# Patient Record
(demographics unavailable — no encounter records)

---

## 2024-11-18 NOTE — HEALTH RISK ASSESSMENT
[Little interest or pleasure doing things] : 1) Little interest or pleasure doing things [Feeling down, depressed, or hopeless] : 2) Feeling down, depressed, or hopeless [0] : 2) Feeling down, depressed, or hopeless: Not at all (0) [PHQ-9 Negative - No further assessment needed] : PHQ-9 Negative - No further assessment needed [[id number]] : 0 [Former] : Former

## 2024-11-18 NOTE — HEALTH RISK ASSESSMENT
[Little interest or pleasure doing things] : 1) Little interest or pleasure doing things [Feeling down, depressed, or hopeless] : 2) Feeling down, depressed, or hopeless [0] : 2) Feeling down, depressed, or hopeless: Not at all (0) [PHQ-9 Negative - No further assessment needed] : PHQ-9 Negative - No further assessment needed [[id number]] : 0 [Former] : Former

## 2024-11-18 NOTE — HEALTH RISK ASSESSMENT
[Little interest or pleasure doing things] : 1) Little interest or pleasure doing things [Feeling down, depressed, or hopeless] : 2) Feeling down, depressed, or hopeless [0] : 2) Feeling down, depressed, or hopeless: Not at all (0) [PHQ-9 Negative - No further assessment needed] : PHQ-9 Negative - No further assessment needed [[id number]] : 0 [Former] : Former

## 2024-11-18 NOTE — HEALTH RISK ASSESSMENT
[Little interest or pleasure doing things] : 1) Little interest or pleasure doing things [Feeling down, depressed, or hopeless] : 2) Feeling down, depressed, or hopeless [0] : 2) Feeling down, depressed, or hopeless: Not at all (0) [PHQ-9 Negative - No further assessment needed] : PHQ-9 Negative - No further assessment needed [[id number]] : 0 [Former] : Former

## 2024-11-18 NOTE — PHYSICAL EXAM
[No Acute Distress] : no acute distress [Well-Appearing] : well-appearing [Normal Sclera/Conjunctiva] : normal sclera/conjunctiva [PERRL] : pupils equal round and reactive to light [Normal Outer Ear/Nose] : the outer ears and nose were normal in appearance [Normal Oropharynx] : the oropharynx was normal [No JVD] : no jugular venous distention [No Lymphadenopathy] : no lymphadenopathy [Supple] : supple [No Respiratory Distress] : no respiratory distress  [Clear to Auscultation] : lungs were clear to auscultation bilaterally [Normal Rate] : normal rate  [Regular Rhythm] : with a regular rhythm [No Edema] : there was no peripheral edema [Soft] : abdomen soft [Non Tender] : non-tender [Non-distended] : non-distended [No CVA Tenderness] : no CVA  tenderness [No Joint Swelling] : no joint swelling [No Rash] : no rash [No Focal Deficits] : no focal deficits [Normal Gait] : normal gait [Normal Affect] : the affect was normal [Normal Insight/Judgement] : insight and judgment were intact

## 2024-11-23 NOTE — HISTORY OF PRESENT ILLNESS
[de-identified] : 63 yo F w/ PMHx of HTN, HLD and GERD, and bilateral carotid artery stenosis presenting today with c/f UTI. Patient reports having urinary incontinence at night with lower abdominal pressure, and urinary frequency during the day for past week and a half. Patient denied having any dysuria, hematuria, fever, chills, N/V. reports hydrating well and taking cranberry supplements. Reports her incontinence has resolved and currently patient is not experiencing any symptoms. Patient denied starting any new medications, however endorsed starting taking baths recently. Reporting having UTI in August and taking Augmentin. Patient has no symptoms today, has no other concerns.  [FreeTextEntry1] : urinary issue

## 2024-11-23 NOTE — END OF VISIT
[] : Resident [FreeTextEntry3] : #urinary incontinence -  10 days. with frequency.  lower abd pressure. symptoms better the last two days. been drinking more water. been using cranberry supplement.   #Htn- cont amlodipine

## 2024-11-23 NOTE — HISTORY OF PRESENT ILLNESS
[de-identified] : 63 yo F w/ PMHx of HTN, HLD and GERD, and bilateral carotid artery stenosis presenting today with c/f UTI. Patient reports having urinary incontinence at night with lower abdominal pressure, and urinary frequency during the day for past week and a half. Patient denied having any dysuria, hematuria, fever, chills, N/V. reports hydrating well and taking cranberry supplements. Reports her incontinence has resolved and currently patient is not experiencing any symptoms. Patient denied starting any new medications, however endorsed starting taking baths recently. Reporting having UTI in August and taking Augmentin. Patient has no symptoms today, has no other concerns.  [FreeTextEntry1] : urinary issue

## 2024-11-23 NOTE — HISTORY OF PRESENT ILLNESS
[FreeTextEntry1] : urinary issue [de-identified] : 65 yo F w/ PMHx of HTN, HLD and GERD, and bilateral carotid artery stenosis presenting today with c/f UTI. Patient reports having urinary incontinence at night with lower abdominal pressure, and urinary frequency during the day for past week and a half. Patient denied having any dysuria, hematuria, fever, chills, N/V. reports hydrating well and taking cranberry supplements. Reports her incontinence has resolved and currently patient is not experiencing any symptoms. Patient denied starting any new medications, however endorsed starting taking baths recently. Reporting having UTI in August and taking Augmentin. Patient has no symptoms today, has no other concerns.

## 2024-11-23 NOTE — HISTORY OF PRESENT ILLNESS
[de-identified] : 63 yo F w/ PMHx of HTN, HLD and GERD, and bilateral carotid artery stenosis presenting today with c/f UTI. Patient reports having urinary incontinence at night with lower abdominal pressure, and urinary frequency during the day for past week and a half. Patient denied having any dysuria, hematuria, fever, chills, N/V. reports hydrating well and taking cranberry supplements. Reports her incontinence has resolved and currently patient is not experiencing any symptoms. Patient denied starting any new medications, however endorsed starting taking baths recently. Reporting having UTI in August and taking Augmentin. Patient has no symptoms today, has no other concerns.  [FreeTextEntry1] : urinary issue

## 2024-11-23 NOTE — REVIEW OF SYSTEMS
[Negative] : Integumentary [Fever] : no fever [Chills] : no chills [Fatigue] : no fatigue [Abdominal Pain] : no abdominal pain [Nausea] : no nausea [Vomiting] : no vomiting [Dysuria] : no dysuria [Incontinence] : no incontinence [Hematuria] : no hematuria [Frequency] : no frequency [Vaginal Discharge] : no vaginal discharge

## 2025-01-03 NOTE — HISTORY OF PRESENT ILLNESS
[FreeTextEntry1] : pt is here for annual physical. [de-identified] : 64 y.o F w/ PMHx of HTN, HLD and GERD, presenting for routine f/u.   Today, states she is in generally good health and has no acute complaints today. Denies further incontinence since last visit. Evaluated by Gyn (Dr. Patel, [facility]) yesterday, referred to urogyn for prolapse and screening DEXA.   Takes amlodipine q3d, based on home DBP readings. Reports home BP 130s/high 80s >> 110s/low 80s after amlodipine. Denies hypotension, lightheadedness, presyncope, palpitations.   Also c/o mild L back discomfort and b/l knee discomfort worst in AM.

## 2025-01-03 NOTE — PHYSICAL EXAM
[Normal] : no joint swelling and grossly normal strength and tone [de-identified] : No suprapubic discomfort

## 2025-01-03 NOTE — HEALTH RISK ASSESSMENT
[Very Good] : ~his/her~  mood as very good [No falls in past year] : Patient reported no falls in the past year [0] : 2) Feeling down, depressed, or hopeless: Not at all (0) [Former] : Former [0-4] : 0-4 [> 15 Years] : > 15 Years [Patient reported mammogram was normal] : Patient reported mammogram was normal [Patient reported colonoscopy was normal] : Patient reported colonoscopy was normal [HIV Test offered] : HIV Test offered [Hepatitis C test offered] : Hepatitis C test offered [Alone] : lives alone [Retired] : retired [College] : College [Single] : single [Fully functional (bathing, dressing, toileting, transferring, walking, feeding)] : Fully functional (bathing, dressing, toileting, transferring, walking, feeding) [Fully functional (using the telephone, shopping, preparing meals, housekeeping, doing laundry, using] : Fully functional and needs no help or supervision to perform IADLs (using the telephone, shopping, preparing meals, housekeeping, doing laundry, using transportation, managing medications and managing finances) [No] : In the past 12 months have you used drugs other than those required for medical reasons? No [Time Spent: ___ Minutes] : I spent [unfilled] minutes performing a depression screening for this patient. [[id number]] : 0 [Change in mental status noted] : No change in mental status noted [Sexually Active] : not sexually active [Reports changes in hearing] : Reports no changes in hearing [Reports changes in vision] : Reports no changes in vision [Reports changes in dental health] : Reports no changes in dental health [MammogramDate] : 08/2024 [PapSmearDate] : 01/2025 [PapSmearComments] : pending restult [BoneDensityComments] : pending scheduling for initial exam [ColonoscopyDate] : 01/2021 [HIVComments] : previously neg per pt, unclear date [HepatitisCComments] : previously neg per pt, unclear date [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

## 2025-01-03 NOTE — REVIEW OF SYSTEMS
[Negative] : Musculoskeletal [Incontinence] : no incontinence [Hematuria] : no hematuria [FreeTextEntry9] : Morning discomfort/stiffness in b/l knees. Mild L-sided low back pain

## 2025-01-03 NOTE — END OF VISIT
[] : Resident [FreeTextEntry3] : 64F w/HTN, HLD and GERD here for CPE and concerns.  HTN - intermittently taking amlodipine q3 days when DBP >80, will decrease to amlodipine 2.5mg  Rest of plan as above.

## 2025-01-31 NOTE — END OF VISIT
[] : Resident [FreeTextEntry3] : 64F w/htn, hld, gerd for VEB, needs letter for volunteering in hospital.  May need MMR titers or proof of vaccination. Recommended to come to clinic if she needs titers OR get MMR vaccine at [facility] in [facility] where she currently is.

## 2025-01-31 NOTE — HISTORY OF PRESENT ILLNESS
[Home] : at home, [unfilled] , at the time of the visit. [Medical Office: ([facility])___] : at the medical office located in  [Verbal consent obtained from patient] : the patient, [unfilled] [FreeTextEntry1] : need for medical clearance letter for volunteering [de-identified] : 64F with PMH of HTN, HLD and GERD presents via video visit to request letter of medical clearance for volunteering at [facility] in [facility]. 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 Patient reports no interim significant medical events since last visit to clinic. Letter requirements - flu vaccine history, general medical health. Also needs copy of rubella and rubeola copy of immunization records, patient informed to go to local [facility] pharmacy for re-vaccination vs present to clinic for titres check.

## 2025-01-31 NOTE — HISTORY OF PRESENT ILLNESS
[Home] : at home, [unfilled] , at the time of the visit. [Medical Office: ([facility])___] : at the medical office located in  [Verbal consent obtained from patient] : the patient, [unfilled] [FreeTextEntry1] : need for medical clearance letter for volunteering [de-identified] : 64F with PMH of HTN, HLD and GERD presents via video visit to request letter of medical clearance for volunteering at [facility] in [facility]. 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 Patient reports no interim significant medical events since last visit to clinic. Letter requirements - flu vaccine history, general medical health. Also needs copy of rubella and rubeola copy of immunization records, patient informed to go to local [facility] pharmacy for re-vaccination vs present to clinic for titres check.

## 2025-01-31 NOTE — END OF VISIT
[] : Resident [FreeTextEntry3] : 64F w/htn, hld, gerd for VEB, needs letter for volunteering in hospital.  May need MMR titers or proof of vaccination. Recommended to come to clinic if she needs titers OR get MMR vaccine at [facility] in [facility] where she currently is.

## 2025-04-07 NOTE — PHYSICAL EXAM
[No Acute Distress] : no acute distress [Well Nourished] : well nourished [Well Developed] : well developed [Well-Appearing] : well-appearing [Normal Sclera/Conjunctiva] : normal sclera/conjunctiva [PERRL] : pupils equal round and reactive to light [EOMI] : extraocular movements intact [Normal Outer Ear/Nose] : the outer ears and nose were normal in appearance [Normal Oropharynx] : the oropharynx was normal [No JVD] : no jugular venous distention [Supple] : supple [No Respiratory Distress] : no respiratory distress  [No Accessory Muscle Use] : no accessory muscle use [Clear to Auscultation] : lungs were clear to auscultation bilaterally [Normal Rate] : normal rate  [Regular Rhythm] : with a regular rhythm [Normal S1, S2] : normal S1 and S2 [No Edema] : there was no peripheral edema [Soft] : abdomen soft [Non Tender] : non-tender [Non-distended] : non-distended [Normal Posterior Cervical Nodes] : no posterior cervical lymphadenopathy [Normal Anterior Cervical Nodes] : no anterior cervical lymphadenopathy [No Spinal Tenderness] : no spinal tenderness [No Joint Swelling] : no joint swelling [Grossly Normal Strength/Tone] : grossly normal strength/tone [No Rash] : no rash [No Focal Deficits] : no focal deficits [Normal Gait] : normal gait [Normal Affect] : the affect was normal [Normal Insight/Judgement] : insight and judgment were intact [de-identified] : No suprabuic tenderness  [de-identified] : Reproducible pain to palpation in R CVA/paraspinal area, No verterbral/L sided pain. Negative straight leg raise test veena

## 2025-04-07 NOTE — HISTORY OF PRESENT ILLNESS
[FreeTextEntry1] : pt here for follow up [de-identified] : 64F with PMH of HTN, HLD and GERD presents for evaluation of 2 year hx of R low back pain.  Patient reports there has been no changes to the pain characteristics/intensity recently however as the pain has been ongoing for 2 years she wanted to be evaluated for this. Pain is localized (non-radiating) to R CVA/paraspinal area, feels "dull", but intermittently intensifies and becomes more notable (never unbearable).She does not know what exacerbates/relives pain. Does not remember an inciting event of when pain first started/no trauma. There is no pain in L side paraspinal area, or any other parts of her body. She remains active, does stretching exercises, and stays hydrated. Denies pain radiating to groin, urinary frequency, urgency, hematuria, hematochezia, conspitation. Of note patient is being evaluated for urinary incontinence by urogyn (at [facility]), thought to be iso pelvic prolapse. Patient reports incontinence is resolved, but she has appt with urogyn next week to discuss pessary placement.

## 2025-04-07 NOTE — ASSESSMENT
[FreeTextEntry1] : 64F with PMH of HTN, HLD and GERD presents for evaluation of 2 year hx of R low back pain.

## 2025-04-07 NOTE — PHYSICAL EXAM
[No Acute Distress] : no acute distress [Well Nourished] : well nourished [Well Developed] : well developed [Well-Appearing] : well-appearing [Normal Sclera/Conjunctiva] : normal sclera/conjunctiva [PERRL] : pupils equal round and reactive to light [EOMI] : extraocular movements intact [Normal Outer Ear/Nose] : the outer ears and nose were normal in appearance [Normal Oropharynx] : the oropharynx was normal [No JVD] : no jugular venous distention [Supple] : supple [No Respiratory Distress] : no respiratory distress  [No Accessory Muscle Use] : no accessory muscle use [Clear to Auscultation] : lungs were clear to auscultation bilaterally [Normal Rate] : normal rate  [Regular Rhythm] : with a regular rhythm [Normal S1, S2] : normal S1 and S2 [No Edema] : there was no peripheral edema [Soft] : abdomen soft [Non Tender] : non-tender [Non-distended] : non-distended [Normal Posterior Cervical Nodes] : no posterior cervical lymphadenopathy [Normal Anterior Cervical Nodes] : no anterior cervical lymphadenopathy [No Spinal Tenderness] : no spinal tenderness [No Joint Swelling] : no joint swelling [Grossly Normal Strength/Tone] : grossly normal strength/tone [No Rash] : no rash [No Focal Deficits] : no focal deficits [Normal Gait] : normal gait [Normal Affect] : the affect was normal [Normal Insight/Judgement] : insight and judgment were intact [de-identified] : No suprabuic tenderness  [de-identified] : Reproducible pain to palpation in R CVA/paraspinal area, No verterbral/L sided pain. Negative straight leg raise test venea

## 2025-04-07 NOTE — END OF VISIT
[] : Resident [FreeTextEntry3] : Pt seen and examined with Dr. Bonilla - agree with assessment and plan as noted.

## 2025-04-07 NOTE — PLAN
38.4
[FreeTextEntry1] : #R back pain  For 2 years, non-radiating, dull. No changes recently. No inciting factors/trauma. No known exacerbating/improving factors has never taken medications for it  -No hematuria, dysuria, frequency. No hx of nephrolithiasis. Of note previosly reported urinary incontinence (now resolved) thought to be iso pelvic organ prolapse now following up with urogyn next week (at [facility]) for pessary placement  -In Nov 2024 when endorsing urinary symptoms, UA was obtained showed Calc Oxalate crystals, no Blood  DDx: MSK vs nephrolithiasis  Plan;  -Obtain CT stone hunt protocol (given persistent unilateral pain, previous Ca oxalate crystals in UA and pt has never had imaging for this before). Advised patient to discuss obtaining this CT with urogyn next week in case there is plan for CT regarding the Pelvic Organ Prolapse by their team, in that case may try to combine imaging to minimize radiation exposure. Patient verbalzied understanding -PT referral - Trial of acetaminophen (patient reported she would like to minimize medication exposure, but may consider taking)  #HTN Previously on Amlodipine 2.5 qd. Patient self-discontinued med few weeks ago as reports her BP at home was in the low 100s. Reports checks BP few times per week and  SBP is on average 115-117.  Today in clinic /83 (at goal)   Plan:  -Encouraged patient to continue checking BP at least few times per week. If persistent at or greater than 140/90 to call clinic and discuss reinitiating amlodipine. Pt verbalized understanding   Discussed with Dr. Edward 
[FreeTextEntry1] : #R back pain  For 2 years, non-radiating, dull. No changes recently. No inciting factors/trauma. No known exacerbating/improving factors has never taken medications for it  -No hematuria, dysuria, frequency. No hx of nephrolithiasis. Of note previosly reported urinary incontinence (now resolved) thought to be iso pelvic organ prolapse now following up with urogyn next week (at [facility]) for pessary placement  -In Nov 2024 when endorsing urinary symptoms, UA was obtained showed Calc Oxalate crystals, no Blood  DDx: MSK vs nephrolithiasis  Plan;  -Obtain CT stone hunt protocol (given persistent unilateral pain, previous Ca oxalate crystals in UA and pt has never had imaging for this before). Advised patient to discuss obtaining this CT with urogyn next week in case there is plan for CT regarding the Pelvic Organ Prolapse by their team, in that case may try to combine imaging to minimize radiation exposure. Patient verbalzied understanding -PT referral - Trial of acetaminophen (patient reported she would like to minimize medication exposure, but may consider taking)  #HTN Previously on Amlodipine 2.5 qd. Patient self-discontinued med few weeks ago as reports her BP at home was in the low 100s. Reports checks BP few times per week and  SBP is on average 115-117.  Today in clinic /83 (at goal)   Plan:  -Encouraged patient to continue checking BP at least few times per week. If persistent at or greater than 140/90 to call clinic and discuss reinitiating amlodipine. Pt verbalized understanding   Discussed with Dr. Edward 
[FreeTextEntry1] : #R back pain  For 2 years, non-radiating, dull. No changes recently. No inciting factors/trauma. No known exacerbating/improving factors has never taken medications for it  -No hematuria, dysuria, frequency. No hx of nephrolithiasis. Of note previosly reported urinary incontinence (now resolved) thought to be iso pelvic organ prolapse now following up with urogyn next week (at [facility]) for pessary placement  -In Nov 2024 when endorsing urinary symptoms, UA was obtained showed Calc Oxalate crystals, no Blood  DDx: MSK vs nephrolithiasis  Plan;  -Obtain CT stone hunt protocol (given persistent unilateral pain, previous Ca oxalate crystals in UA and pt has never had imaging for this before). Advised patient to discuss obtaining this CT with urogyn next week in case there is plan for CT regarding the Pelvic Organ Prolapse by their team, in that case may try to combine imaging to minimize radiation exposure. Patient verbalzied understanding -PT referral - Trial of acetaminophen (patient reported she would like to minimize medication exposure, but may consider taking)  #HTN Previously on Amlodipine 2.5 qd. Patient self-discontinued med few weeks ago as reports her BP at home was in the low 100s. Reports checks BP few times per week and  SBP is on average 115-117.  Today in clinic /83 (at goal)   Plan:  -Encouraged patient to continue checking BP at least few times per week. If persistent at or greater than 140/90 to call clinic and discuss reinitiating amlodipine. Pt verbalized understanding   Discussed with Dr. Edward

## 2025-04-07 NOTE — HISTORY OF PRESENT ILLNESS
[FreeTextEntry1] : pt here for follow up [de-identified] : 64F with PMH of HTN, HLD and GERD presents for evaluation of 2 year hx of R low back pain.  Patient reports there has been no changes to the pain characteristics/intensity recently however as the pain has been ongoing for 2 years she wanted to be evaluated for this. Pain is localized (non-radiating) to R CVA/paraspinal area, feels "dull", but intermittently intensifies and becomes more notable (never unbearable).She does not know what exacerbates/relives pain. Does not remember an inciting event of when pain first started/no trauma. There is no pain in L side paraspinal area, or any other parts of her body. She remains active, does stretching exercises, and stays hydrated. Denies pain radiating to groin, urinary frequency, urgency, hematuria, hematochezia, conspitation. Of note patient is being evaluated for urinary incontinence by urogyn (at [facility]), thought to be iso pelvic prolapse. Patient reports incontinence is resolved, but she has appt with urogyn next week to discuss pessary placement.

## 2025-04-07 NOTE — REVIEW OF SYSTEMS
[Back Pain] : back pain [Fever] : no fever [Chills] : no chills [Night Sweats] : no night sweats [Recent Change In Weight] : ~T no recent weight change [Discharge] : no discharge [Pain] : no pain [Redness] : no redness [Earache] : no earache [Hearing Loss] : no hearing loss [Nasal Discharge] : no nasal discharge [Chest Pain] : no chest pain [Palpitations] : no palpitations [Orthopnea] : no orthopnea [Shortness Of Breath] : no shortness of breath [Wheezing] : no wheezing [Cough] : no cough [Abdominal Pain] : no abdominal pain [Nausea] : no nausea [Vomiting] : no vomiting [Dysuria] : no dysuria [Incontinence] : no incontinence [Hematuria] : no hematuria [Frequency] : no frequency [Joint Pain] : no joint pain [Joint Stiffness] : no joint stiffness [Muscle Pain] : no muscle pain [Itching] : no itching [Skin Rash] : no skin rash [Headache] : no headache [Dizziness] : no dizziness [Fainting] : no fainting [Insomnia] : no insomnia [Anxiety] : no anxiety [Depression] : no depression [Easy Bleeding] : no easy bleeding [Easy Bruising] : no easy bruising

## 2025-04-07 NOTE — HISTORY OF PRESENT ILLNESS
[FreeTextEntry1] : pt here for follow up [de-identified] : 64F with PMH of HTN, HLD and GERD presents for evaluation of 2 year hx of R low back pain.  Patient reports there has been no changes to the pain characteristics/intensity recently however as the pain has been ongoing for 2 years she wanted to be evaluated for this. Pain is localized (non-radiating) to R CVA/paraspinal area, feels "dull", but intermittently intensifies and becomes more notable (never unbearable).She does not know what exacerbates/relives pain. Does not remember an inciting event of when pain first started/no trauma. There is no pain in L side paraspinal area, or any other parts of her body. She remains active, does stretching exercises, and stays hydrated. Denies pain radiating to groin, urinary frequency, urgency, hematuria, hematochezia, conspitation. Of note patient is being evaluated for urinary incontinence by urogyn (at [facility]), thought to be iso pelvic prolapse. Patient reports incontinence is resolved, but she has appt with urogyn next week to discuss pessary placement.

## 2025-04-07 NOTE — PHYSICAL EXAM
[No Acute Distress] : no acute distress [Well Nourished] : well nourished [Well Developed] : well developed [Well-Appearing] : well-appearing [Normal Sclera/Conjunctiva] : normal sclera/conjunctiva [PERRL] : pupils equal round and reactive to light [EOMI] : extraocular movements intact [Normal Outer Ear/Nose] : the outer ears and nose were normal in appearance [Normal Oropharynx] : the oropharynx was normal [No JVD] : no jugular venous distention [Supple] : supple [No Respiratory Distress] : no respiratory distress  [No Accessory Muscle Use] : no accessory muscle use [Clear to Auscultation] : lungs were clear to auscultation bilaterally [Normal Rate] : normal rate  [Regular Rhythm] : with a regular rhythm [Normal S1, S2] : normal S1 and S2 [No Edema] : there was no peripheral edema [Soft] : abdomen soft [Non Tender] : non-tender [Non-distended] : non-distended [Normal Posterior Cervical Nodes] : no posterior cervical lymphadenopathy [Normal Anterior Cervical Nodes] : no anterior cervical lymphadenopathy [No Spinal Tenderness] : no spinal tenderness [No Joint Swelling] : no joint swelling [Grossly Normal Strength/Tone] : grossly normal strength/tone [No Rash] : no rash [No Focal Deficits] : no focal deficits [Normal Gait] : normal gait [Normal Affect] : the affect was normal [Normal Insight/Judgement] : insight and judgment were intact [de-identified] : No suprabuic tenderness  [de-identified] : Reproducible pain to palpation in R CVA/paraspinal area, No verterbral/L sided pain. Negative straight leg raise test veena